# Patient Record
Sex: MALE | Race: WHITE
[De-identification: names, ages, dates, MRNs, and addresses within clinical notes are randomized per-mention and may not be internally consistent; named-entity substitution may affect disease eponyms.]

---

## 2017-05-07 ENCOUNTER — HOSPITAL ENCOUNTER (EMERGENCY)
Dept: HOSPITAL 47 - EC | Age: 32
Discharge: HOME | End: 2017-05-07
Payer: COMMERCIAL

## 2017-05-07 VITALS
TEMPERATURE: 98.3 F | DIASTOLIC BLOOD PRESSURE: 79 MMHG | SYSTOLIC BLOOD PRESSURE: 129 MMHG | HEART RATE: 77 BPM | RESPIRATION RATE: 20 BRPM
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
## 2017-05-07 DIAGNOSIS — F17.200: ICD-10-CM

## 2017-05-07 DIAGNOSIS — S02.5XXA: Primary | ICD-10-CM

## 2017-05-07 PROCEDURE — 96372 THER/PROPH/DIAG INJ SC/IM: CPT

## 2017-05-07 PROCEDURE — 99282 EMERGENCY DEPT VISIT SF MDM: CPT

## 2017-05-07 NOTE — ED
ENT HPI





- General


Chief complaint: Dental/Oral


Stated complaint: tooth pain


Time Seen by Provider: 05/07/17 12:15


Source: patient, RN notes reviewed


Mode of arrival: ambulatory


Limitations: no limitations





- History of Present Illness


Initial comments: 





32 year old male presents to the ER complaining of dental pain that started 

yesterday.  That he does have a history of a broken tooth on the lower left 

side.  He states that the pain is diffuse on the left side upper and lower jaw.

  He states that he does not have any tongue swelling, difficulty swallowing, 

throat pain, drooling.  He states that he has not been eating due to the pain 

of chewing.  He does take Norco regularly for osteoarthritis at home however 

this hasn't even been helping the pain.  He denies any constitutional symptoms 

including chest pain, abdominal pain, shortness of breath.





- Related Data


 Home Medications











 Medication  Instructions  Recorded  Confirmed


 


HYDROcodone/APAP 10-325MG [Norco 1 each PO Q6H PRN 08/04/15 08/04/15





10]   








 Previous Rx's











 Medication  Instructions  Recorded


 


Ketorolac [Toradol] 10 mg PO Q6HR #15 tab 08/04/15


 


Tamsulosin HCl [Flomax] 0.4 mg PO DAILY #10 cap 08/04/15


 


Penicillin V Potassium [Pen Vee K] 500 mg PO TID #30 tab 05/07/17











 Allergies











Allergy/AdvReac Type Severity Reaction Status Date / Time


 


No Known Allergies Allergy   Verified 05/07/17 11:23














Review of Systems


ROS Statement: 


Those systems with pertinent positive or pertinent negative responses have been 

documented in the HPI.





ROS Other: All systems not noted in ROS Statement are negative.





Past Medical History


Past Medical History: Osteoarthritis (OA)


Additional Past Medical History / Comment(s): back pain, shoulder pain, kidney 

stones, neck pain


History of Any Multi-Drug Resistant Organisms: None Reported


Past Surgical History: No Surgical Hx Reported


Past Psychological History: No Psychological Hx Reported


Smoking Status: Current every day smoker


Past Alcohol Use History: None Reported


Past Drug Use History: None Reported





General Exam


Limitations: no limitations


General appearance: alert, other (appears to be in discomfort)


Head exam: Present: atraumatic


Eye exam: Present: normal appearance, PERRL, EOMI


Pupils: Present: normal accommodation


ENT exam: Present: mucous membranes moist, normal external ear exam, other (

mild edema left cheek, tenderness with palpation upper and lower jaw, no 

abscess appreciated, broken molar lower left, right WNL)


Neck exam: Present: lymphadenopathy (mild left cervical)


Respiratory exam: Present: wheezes (mild secondary to smoking)


Cardiovascular Exam: Present: regular rate, normal rhythm





Course





 Vital Signs











  05/07/17





  11:21


 


Temperature 98.3 F


 


Pulse Rate 77


 


Respiratory 20





Rate 


 


Blood Pressure 129/79


 


O2 Sat by Pulse 98





Oximetry 














Medical Decision Making





- Medical Decision Making





32 year old male presented to the ER complaining of dental and jaw pain on the 

left side.  He stated that the pain worsened yesterday and had trouble sleeping 

last night.  He states that he has undergone regularly for back pain and this 

is not helping the pain currently.  He states that he does have to work this 

week and is concerned with a possible infection and the pain.  Upon exam he 

does have some mild tissue swelling on the left cheek and he does have 

tenderness with palpation on the upper and lower molars.  There is no evidence 

of abscess.  We will recommend treatment with an follow-up with a dentist this 

week.  Patient was given Toradol injection in the ER to help control 

inflammation and pain.  Recommended to continue with his home dose of his 

Norco.  Patient is to return to the ER with any difficulty swallowing, drooling

, throat pain.  Patient was agreeable to treatment plan and voiced 

understanding.





Disposition


Clinical Impression: 


 Fracture of tooth, Oral infection





Disposition: HOME SELF-CARE


Condition: Good


Instructions:  Toothache (ED)


Additional Instructions: 


To return to the ER if any worsening symptoms or concerns.  To follow up with 

dentist this week.  To call insurance company with list of dentists available.


Referrals: 


Louis Marinelli MD [Primary Care Provider] - 1-2 days


Time of Disposition: 12:41

## 2018-03-21 ENCOUNTER — HOSPITAL ENCOUNTER (EMERGENCY)
Dept: HOSPITAL 47 - EC | Age: 33
Discharge: HOME | End: 2018-03-21
Payer: COMMERCIAL

## 2018-03-21 VITALS
SYSTOLIC BLOOD PRESSURE: 101 MMHG | TEMPERATURE: 97.2 F | RESPIRATION RATE: 16 BRPM | HEART RATE: 57 BPM | DIASTOLIC BLOOD PRESSURE: 56 MMHG

## 2018-03-21 DIAGNOSIS — M19.90: ICD-10-CM

## 2018-03-21 DIAGNOSIS — R52: ICD-10-CM

## 2018-03-21 DIAGNOSIS — R63.8: Primary | ICD-10-CM

## 2018-03-21 DIAGNOSIS — F17.200: ICD-10-CM

## 2018-03-21 DIAGNOSIS — Z79.891: ICD-10-CM

## 2018-03-21 DIAGNOSIS — R68.83: ICD-10-CM

## 2018-03-21 DIAGNOSIS — Z79.899: ICD-10-CM

## 2018-03-21 LAB
ANION GAP SERPL CALC-SCNC: 11 MMOL/L
BASOPHILS # BLD AUTO: 0.1 K/UL (ref 0–0.2)
BASOPHILS NFR BLD AUTO: 1 %
BUN SERPL-SCNC: 16 MG/DL (ref 9–20)
CALCIUM SPEC-MCNC: 9.6 MG/DL (ref 8.4–10.2)
CHLORIDE SERPL-SCNC: 101 MMOL/L (ref 98–107)
CO2 SERPL-SCNC: 28 MMOL/L (ref 22–30)
EOSINOPHIL # BLD AUTO: 0.1 K/UL (ref 0–0.7)
EOSINOPHIL NFR BLD AUTO: 2 %
ERYTHROCYTE [DISTWIDTH] IN BLOOD BY AUTOMATED COUNT: 4.62 M/UL (ref 4.3–5.9)
ERYTHROCYTE [DISTWIDTH] IN BLOOD: 12.6 % (ref 11.5–15.5)
GLUCOSE SERPL-MCNC: 87 MG/DL (ref 74–99)
HCT VFR BLD AUTO: 43.6 % (ref 39–53)
HGB BLD-MCNC: 15.4 GM/DL (ref 13–17.5)
LYMPHOCYTES # SPEC AUTO: 1.2 K/UL (ref 1–4.8)
LYMPHOCYTES NFR SPEC AUTO: 17 %
MCH RBC QN AUTO: 33.3 PG (ref 25–35)
MCHC RBC AUTO-ENTMCNC: 35.3 G/DL (ref 31–37)
MCV RBC AUTO: 94.4 FL (ref 80–100)
MONOCYTES # BLD AUTO: 0.4 K/UL (ref 0–1)
MONOCYTES NFR BLD AUTO: 5 %
NEUTROPHILS # BLD AUTO: 5.4 K/UL (ref 1.3–7.7)
NEUTROPHILS NFR BLD AUTO: 74 %
PH UR: 7 [PH] (ref 5–8)
PLATELET # BLD AUTO: 268 K/UL (ref 150–450)
POTASSIUM SERPL-SCNC: 4.4 MMOL/L (ref 3.5–5.1)
SODIUM SERPL-SCNC: 140 MMOL/L (ref 137–145)
SP GR UR: 1.02 (ref 1–1.03)
UROBILINOGEN UR QL STRIP: 2 MG/DL (ref ?–2)
WBC # BLD AUTO: 7.3 K/UL (ref 3.8–10.6)

## 2018-03-21 PROCEDURE — 80048 BASIC METABOLIC PNL TOTAL CA: CPT

## 2018-03-21 PROCEDURE — 99284 EMERGENCY DEPT VISIT MOD MDM: CPT

## 2018-03-21 PROCEDURE — 85025 COMPLETE CBC W/AUTO DIFF WBC: CPT

## 2018-03-21 PROCEDURE — 87086 URINE CULTURE/COLONY COUNT: CPT

## 2018-03-21 PROCEDURE — 96360 HYDRATION IV INFUSION INIT: CPT

## 2018-03-21 PROCEDURE — 87502 INFLUENZA DNA AMP PROBE: CPT

## 2018-03-21 PROCEDURE — 36415 COLL VENOUS BLD VENIPUNCTURE: CPT

## 2018-03-21 PROCEDURE — 81003 URINALYSIS AUTO W/O SCOPE: CPT

## 2018-03-21 PROCEDURE — 96361 HYDRATE IV INFUSION ADD-ON: CPT

## 2018-03-21 PROCEDURE — 80306 DRUG TEST PRSMV INSTRMNT: CPT

## 2018-03-21 NOTE — ED
Recheck HPI





- General


Chief Complaint: Recheck/Abnormal Lab/Rx


Stated Complaint: FLU LIKE SYMPTOMS


Time Seen by Provider: 03/21/18 08:15


Source: patient, RN notes reviewed, old records reviewed


Mode of arrival: ambulatory


Limitations: no limitations





- History of Present Illness


Initial Comments: 





patient 33-year-old male comes in chief complaint of body aches, chills, lack 

of appetite.  He reports his symptoms going on for 2 days.  He states he has 

not been drinking any much fluids.  Patient states that he has no specific 

belly pain.  Normal bowel movements and urination.  He states he's had no 

vomiting.  He denies any specific sore throat or coughing.  Denies any chest 

pain or shortness of breath.  He is a smoker.  He does take Norco regularly for 

chronic arthritis.





- Related Data


 Home Medications











 Medication  Instructions  Recorded  Confirmed


 


HYDROcodone/APAP 10-325MG [Norco 1 tab PO Q6H PRN 08/04/15 03/21/18





10]   








 Previous Rx's











 Medication  Instructions  Recorded


 


Ondansetron Odt [Zofran Odt] 4 mg PO Q8HR PRN #12 tab 03/21/18











 Allergies











Allergy/AdvReac Type Severity Reaction Status Date / Time


 


No Known Allergies Allergy   Verified 03/21/18 08:31














Review of Systems


ROS Statement: 


Those systems with pertinent positive or pertinent negative responses have been 

documented in the HPI.





ROS Other: All systems not noted in ROS Statement are negative.





Past Medical History


Past Medical History: Osteoarthritis (OA)


Additional Past Medical History / Comment(s): back pain, shoulder pain, kidney 

stones, neck pain


History of Any Multi-Drug Resistant Organisms: None Reported


Past Surgical History: No Surgical Hx Reported


Past Psychological History: No Psychological Hx Reported


Smoking Status: Current every day smoker


Past Alcohol Use History: None Reported


Past Drug Use History: None Reported





General Exam





- General Exam Comments


Initial Comments: 





this is a 33-year-old male.  No acute distress.


Limitations: no limitations


General appearance: alert, in no apparent distress


Head exam: Present: atraumatic, normocephalic, normal inspection


Eye exam: Present: normal appearance, PERRL, EOMI.  Absent: scleral icterus, 

conjunctival injection, periorbital swelling


ENT exam: Present: normal exam, mucous membranes moist


Neck exam: Present: normal inspection.  Absent: tenderness, meningismus, 

lymphadenopathy


Respiratory exam: Present: normal lung sounds bilaterally


Cardiovascular Exam: Present: regular rate, normal rhythm, normal heart sounds.

  Absent: systolic murmur, diastolic murmur, rubs, gallop, clicks


GI/Abdominal exam: Present: soft, normal bowel sounds.  Absent: distended, 

tenderness, guarding, rebound, rigid


Extremities exam: Present: normal inspection, full ROM, normal capillary 

refill.  Absent: tenderness, pedal edema, joint swelling, calf tenderness


Back exam: Present: normal inspection


Neurological exam: Present: alert, oriented X3, CN II-XII intact


Psychiatric exam: Present: normal affect, normal mood


Skin exam: Present: warm, dry, intact, normal color.  Absent: rash





Course


 Vital Signs











  03/21/18 03/21/18





  08:12 10:25


 


Temperature 98.1 F 97.2 F L


 


Pulse Rate 68 57 L


 


Respiratory 20 16





Rate  


 


Blood Pressure 119/68 101/56


 


O2 Sat by Pulse 98 98





Oximetry  














Medical Decision Making





- Medical Decision Making


Patient 33-year-old male comes in chief complaint of body aches, chills, lack 

of appetite.  He reports his symptoms going on for 2 days.  He states he has 

not been drinking any much fluids. Patient has no one specific symptoms. No 

abdominal pain, chest pain, cough, shortness of breath. Physical exam findings 

are benign. No tenderness, and lungs are clear. Pt was given IV fluids and labs 

are within normal limitis. Discussed likely viral etiology. Will discharge with 

zofran for nausea, and advised to stay hydrated. Discussed return parameters. 








- Lab Data


Result diagrams: 


 03/21/18 08:45





 03/21/18 08:45


 Lab Results











  03/21/18 03/21/18 03/21/18 Range/Units





  08:45 08:45 08:45 


 


WBC    7.3  (3.8-10.6)  k/uL


 


RBC    4.62  (4.30-5.90)  m/uL


 


Hgb    15.4  (13.0-17.5)  gm/dL


 


Hct    43.6  (39.0-53.0)  %


 


MCV    94.4  (80.0-100.0)  fL


 


MCH    33.3  (25.0-35.0)  pg


 


MCHC    35.3  (31.0-37.0)  g/dL


 


RDW    12.6  (11.5-15.5)  %


 


Plt Count    268  (150-450)  k/uL


 


Neutrophils %    74  %


 


Lymphocytes %    17  %


 


Monocytes %    5  %


 


Eosinophils %    2  %


 


Basophils %    1  %


 


Neutrophils #    5.4  (1.3-7.7)  k/uL


 


Lymphocytes #    1.2  (1.0-4.8)  k/uL


 


Monocytes #    0.4  (0-1.0)  k/uL


 


Eosinophils #    0.1  (0-0.7)  k/uL


 


Basophils #    0.1  (0-0.2)  k/uL


 


Sodium   140   (137-145)  mmol/L


 


Potassium   4.4   (3.5-5.1)  mmol/L


 


Chloride   101   ()  mmol/L


 


Carbon Dioxide   28   (22-30)  mmol/L


 


Anion Gap   11   mmol/L


 


BUN   16   (9-20)  mg/dL


 


Creatinine   0.79   (0.66-1.25)  mg/dL


 


Est GFR (CKD-EPI)AfAm   >90   (>60 ml/min/1.73 sqM)  


 


Est GFR (CKD-EPI)NonAf   >90   (>60 ml/min/1.73 sqM)  


 


Glucose   87   (74-99)  mg/dL


 


Calcium   9.6   (8.4-10.2)  mg/dL


 


Urine Color  Yellow    


 


Urine Appearance  Clear    (Clear)  


 


Urine pH  7.0    (5.0-8.0)  


 


Ur Specific Gravity  1.017    (1.001-1.035)  


 


Urine Protein  Negative    (Negative)  


 


Urine Glucose (UA)  Negative    (Negative)  


 


Urine Ketones  Negative    (Negative)  


 


Urine Blood  Negative    (Negative)  


 


Urine Nitrite  Negative    (Negative)  


 


Urine Bilirubin  Negative    (Negative)  


 


Urine Urobilinogen  2.0    (<2.0)  mg/dL


 


Ur Leukocyte Esterase  Negative    (Negative)  


 


Urine Opiates Screen  Detected H    (NotDetected)  


 


Ur Oxycodone Screen  Not Detected    (NotDetected)  


 


Urine Methadone Screen  Not Detected    (NotDetected)  


 


Ur Propoxyphene Screen  Not Detected    (NotDetected)  


 


Ur Barbiturates Screen  Not Detected    (NotDetected)  


 


U Tricyclic Antidepress  Not Detected    (NotDetected)  


 


Ur Phencyclidine Scrn  Not Detected    (NotDetected)  


 


Ur Amphetamines Screen  Not Detected    (NotDetected)  


 


U Methamphetamines Scrn  Not Detected    (NotDetected)  


 


U Benzodiazepines Scrn  Not Detected    (NotDetected)  


 


Urine Cocaine Screen  Not Detected    (NotDetected)  


 


U Marijuana (THC) Screen  Not Detected    (NotDetected)  


 


Influenza Type A RNA     (Not Detectd)  


 


Influenza Type B (PCR)     (Not Detectd)  














  03/21/18 Range/Units





  08:45 


 


WBC   (3.8-10.6)  k/uL


 


RBC   (4.30-5.90)  m/uL


 


Hgb   (13.0-17.5)  gm/dL


 


Hct   (39.0-53.0)  %


 


MCV   (80.0-100.0)  fL


 


MCH   (25.0-35.0)  pg


 


MCHC   (31.0-37.0)  g/dL


 


RDW   (11.5-15.5)  %


 


Plt Count   (150-450)  k/uL


 


Neutrophils %   %


 


Lymphocytes %   %


 


Monocytes %   %


 


Eosinophils %   %


 


Basophils %   %


 


Neutrophils #   (1.3-7.7)  k/uL


 


Lymphocytes #   (1.0-4.8)  k/uL


 


Monocytes #   (0-1.0)  k/uL


 


Eosinophils #   (0-0.7)  k/uL


 


Basophils #   (0-0.2)  k/uL


 


Sodium   (137-145)  mmol/L


 


Potassium   (3.5-5.1)  mmol/L


 


Chloride   ()  mmol/L


 


Carbon Dioxide   (22-30)  mmol/L


 


Anion Gap   mmol/L


 


BUN   (9-20)  mg/dL


 


Creatinine   (0.66-1.25)  mg/dL


 


Est GFR (CKD-EPI)AfAm   (>60 ml/min/1.73 sqM)  


 


Est GFR (CKD-EPI)NonAf   (>60 ml/min/1.73 sqM)  


 


Glucose   (74-99)  mg/dL


 


Calcium   (8.4-10.2)  mg/dL


 


Urine Color   


 


Urine Appearance   (Clear)  


 


Urine pH   (5.0-8.0)  


 


Ur Specific Gravity   (1.001-1.035)  


 


Urine Protein   (Negative)  


 


Urine Glucose (UA)   (Negative)  


 


Urine Ketones   (Negative)  


 


Urine Blood   (Negative)  


 


Urine Nitrite   (Negative)  


 


Urine Bilirubin   (Negative)  


 


Urine Urobilinogen   (<2.0)  mg/dL


 


Ur Leukocyte Esterase   (Negative)  


 


Urine Opiates Screen   (NotDetected)  


 


Ur Oxycodone Screen   (NotDetected)  


 


Urine Methadone Screen   (NotDetected)  


 


Ur Propoxyphene Screen   (NotDetected)  


 


Ur Barbiturates Screen   (NotDetected)  


 


U Tricyclic Antidepress   (NotDetected)  


 


Ur Phencyclidine Scrn   (NotDetected)  


 


Ur Amphetamines Screen   (NotDetected)  


 


U Methamphetamines Scrn   (NotDetected)  


 


U Benzodiazepines Scrn   (NotDetected)  


 


Urine Cocaine Screen   (NotDetected)  


 


U Marijuana (THC) Screen   (NotDetected)  


 


Influenza Type A RNA  Not Detected  (Not Detectd)  


 


Influenza Type B (PCR)  Not Detected  (Not Detectd)  














Disposition


Clinical Impression: 


 Poor appetite, Body aches





Disposition: HOME SELF-CARE


Condition: Good


Instructions:  Viral Syndrome (ED)


Additional Instructions: 


He  needs to rest, increase  fluid intake.  Follow-up with primary care 

provider symptoms are continue to persist over the next couple days.  Take 

nausea medicine as needed.  Return to emergency department if any alarming 

signs or symptoms occur.


Prescriptions: 


Ondansetron Odt [Zofran Odt] 4 mg PO Q8HR PRN #12 tab


 PRN Reason: Nausea


Referrals: 


Louis Marinelli MD [Primary Care Provider] - 1-2 days


Time of Disposition: 09:57

## 2018-09-17 ENCOUNTER — HOSPITAL ENCOUNTER (EMERGENCY)
Dept: HOSPITAL 47 - EC | Age: 33
Discharge: HOME | End: 2018-09-17
Payer: COMMERCIAL

## 2018-09-17 VITALS
SYSTOLIC BLOOD PRESSURE: 116 MMHG | HEART RATE: 60 BPM | TEMPERATURE: 98.1 F | RESPIRATION RATE: 16 BRPM | DIASTOLIC BLOOD PRESSURE: 74 MMHG

## 2018-09-17 DIAGNOSIS — F17.200: ICD-10-CM

## 2018-09-17 DIAGNOSIS — N20.2: Primary | ICD-10-CM

## 2018-09-17 LAB
ANION GAP SERPL CALC-SCNC: 10 MMOL/L
BASOPHILS # BLD AUTO: 0.1 K/UL (ref 0–0.2)
BASOPHILS NFR BLD AUTO: 0 %
BUN SERPL-SCNC: 23 MG/DL (ref 9–20)
CALCIUM SPEC-MCNC: 9.7 MG/DL (ref 8.4–10.2)
CHLORIDE SERPL-SCNC: 103 MMOL/L (ref 98–107)
CO2 SERPL-SCNC: 27 MMOL/L (ref 22–30)
EOSINOPHIL # BLD AUTO: 0.2 K/UL (ref 0–0.7)
EOSINOPHIL NFR BLD AUTO: 1 %
ERYTHROCYTE [DISTWIDTH] IN BLOOD BY AUTOMATED COUNT: 4.57 M/UL (ref 4.3–5.9)
ERYTHROCYTE [DISTWIDTH] IN BLOOD: 12.6 % (ref 11.5–15.5)
GLUCOSE SERPL-MCNC: 93 MG/DL (ref 74–99)
HCT VFR BLD AUTO: 44.9 % (ref 39–53)
HGB BLD-MCNC: 14.5 GM/DL (ref 13–17.5)
LYMPHOCYTES # SPEC AUTO: 1.7 K/UL (ref 1–4.8)
LYMPHOCYTES NFR SPEC AUTO: 13 %
MCH RBC QN AUTO: 31.9 PG (ref 25–35)
MCHC RBC AUTO-ENTMCNC: 32.4 G/DL (ref 31–37)
MCV RBC AUTO: 98.3 FL (ref 80–100)
MONOCYTES # BLD AUTO: 0.9 K/UL (ref 0–1)
MONOCYTES NFR BLD AUTO: 7 %
NEUTROPHILS # BLD AUTO: 9.9 K/UL (ref 1.3–7.7)
NEUTROPHILS NFR BLD AUTO: 77 %
PH UR: 6 [PH] (ref 5–8)
PLATELET # BLD AUTO: 276 K/UL (ref 150–450)
POTASSIUM SERPL-SCNC: 3.9 MMOL/L (ref 3.5–5.1)
PROT UR QL: (no result)
RBC UR QL: 63 /HPF (ref 0–5)
SODIUM SERPL-SCNC: 140 MMOL/L (ref 137–145)
SP GR UR: 1.03 (ref 1–1.03)
UROBILINOGEN UR QL STRIP: <2 MG/DL (ref ?–2)
WBC # BLD AUTO: 13 K/UL (ref 3.8–10.6)
WBC #/AREA URNS HPF: 2 /HPF (ref 0–5)

## 2018-09-17 PROCEDURE — 36415 COLL VENOUS BLD VENIPUNCTURE: CPT

## 2018-09-17 PROCEDURE — 81001 URINALYSIS AUTO W/SCOPE: CPT

## 2018-09-17 PROCEDURE — 74177 CT ABD & PELVIS W/CONTRAST: CPT

## 2018-09-17 PROCEDURE — 80048 BASIC METABOLIC PNL TOTAL CA: CPT

## 2018-09-17 PROCEDURE — 85025 COMPLETE CBC W/AUTO DIFF WBC: CPT

## 2018-09-17 PROCEDURE — 96375 TX/PRO/DX INJ NEW DRUG ADDON: CPT

## 2018-09-17 PROCEDURE — 96374 THER/PROPH/DIAG INJ IV PUSH: CPT

## 2018-09-17 PROCEDURE — 99284 EMERGENCY DEPT VISIT MOD MDM: CPT

## 2018-09-17 PROCEDURE — 96361 HYDRATE IV INFUSION ADD-ON: CPT

## 2018-09-17 NOTE — CT
EXAMINATION TYPE: CT abdomen pelvis w con

 

DATE OF EXAM: 9/17/2018

 

COMPARISON: 4/18/2014

 

HISTORY: Abdominal pain, kidney stones

 

CT DLP: 760 mGycm

Automated exposure control for dose reduction was used.

 

TECHNIQUE:  Helical acquisition of images was performed from the lung bases through the pelvis.

 

CONTRAST: 

Performed without Oral Contrast and with IV Contrast, patient injected with 100 ml mL of Isovue 300.

 

FINDINGS: 

 

Lung bases are clear. There is no pleural effusion. Liver appears normal. There are multiple calcifie
d granulomata in the spleen. There is no pancreatic mass. Gallbladder appears normal. There is no adr
enal mass. There is left side hydronephrosis and hydroureter. There is left-sided perinephric edema.

 

There is decreased cortical contrast opacification of the left kidney compared to the right. Delayed 
images show normal opacification of the right ureter. There are bilateral small renal calculi. There 
is a 6 mm calculus in the proximal left ureter.

There is high-grade left renal obstruction.

There is no retroperitoneal adenopathy. There is no ascites. There is no intestinal wall thickening. 
There are no dilated loops. Appendix appears normal. Bladder distends smoothly. There is no evidence 
of a pelvic mass. Lumbar spine appears intact.

 

IMPRESSION: 

MULTIPLE SMALL RENAL BILATERAL CALCULI.

 

OBSTRUCTING CALCULUS AT THE LEFT PROXIMAL URETER. OBSTRUCTION IS NEW COMPARED TO OLD EXAM.

## 2018-09-17 NOTE — ED
General Adult HPI





- General


Chief complaint: Abdominal Pain


Stated complaint: Abd pain, kidney stone


Time Seen by Provider: 09/17/18 05:47


Source: patient, EMS


Mode of arrival: EMS


Limitations: no limitations





- History of Present Illness


Initial comments: 


Tanner is a 33-year-old male with a past medical history of kidney stone in 

the past who presents the emergency department today for evaluation of left 

lower quadrant abdominal pain.  Patient reports that yesterday he had some 

diarrhea, he reports 7 or 8 episodes of nonbloody diarrhea.  He reports that he 

fell squarely developed some left-sided abdominal pain.  Pain began around 10 

PM.  Was originally crampy in nature with waves of sharp stabbing pain.  Pain 

persisted throughout the night kept him awake.  Patient can't identify any 

relieving or exacerbating factors to the pain.  The pain did not go which 

prompted him to call an ambulance for transport to the emergency department.  

Pain is associated with nausea but no vomiting.  Patient has not had any 

further episodes of diarrhea since the pain began.  Patient has no known GI 

pathology, he has no history of ulcerative colitis or Crohn's, no family 

history of such.  He has never had a colonoscopy.  He has any suspicious food 

intake.


Patient does state that he does take chronic narcotics for his chronic back 

pain.








- Related Data


 Home Medications











 Medication  Instructions  Recorded  Confirmed


 


HYDROcodone/APAP 10-325MG [Norco 1 tab PO Q6H PRN 08/04/15 09/17/18





10]   








 Previous Rx's











 Medication  Instructions  Recorded


 


Ondansetron [Zofran ODT] 4 mg PO Q8HR #12 tab 09/17/18


 


Tamsulosin [Flomax] 0.4 mg PO DAILY #7 cap 09/17/18











 Allergies











Allergy/AdvReac Type Severity Reaction Status Date / Time


 


No Known Allergies Allergy   Verified 09/17/18 07:15














Review of Systems


ROS Statement: 


Those systems with pertinent positive or pertinent negative responses have been 

documented in the HPI.





ROS Other: All systems not noted in ROS Statement are negative.





Past Medical History


Past Medical History: Osteoarthritis (OA)


Additional Past Medical History / Comment(s): back pain, shoulder pain, kidney 

stones, neck pain


History of Any Multi-Drug Resistant Organisms: None Reported


Past Surgical History: No Surgical Hx Reported


Past Psychological History: Anxiety, Bipolar


Smoking Status: Current every day smoker


Past Alcohol Use History: Rare


Past Drug Use History: None Reported





General Exam





- General Exam Comments


Initial Comments: 


GENERAL:


Patient is well-developed and well-nourished.  Patient is nontoxic and well-

hydrated


Patient in moderate distress





HENT:


Normocephalic, Atraumatic. 


Neck is soft and supple.  No significant lymphadenopathy is noted.  Oropharynx 

is clear.  Moist mucous membranes.  Neck has full range of motion without 

eliciting any pain.  





EYES:


The sclera were anicteric and conjunctiva were pink and moist.  Extraocular 

movements were intact and pupils were equal round and reactive to light.  

Eyelids were unremarkable.





PULMONARY:


Unlabored respirations.  Good breath sounds bilaterally.  No audible rales 

rhonchi or wheezing was noted.





CARDIOVASCULAR:


There is a regular rate and rhythm without any murmurs gallops or rubs.  





ABDOMEN:


Abdomen is soft, tender to palpation of the left lower quadrant, no flank pain 

elicited upon evaluation





SKIN:


Skin is clear with no lesions or rashes and otherwise unremarkable.





NEUROLOGIC:


Patient is alert and oriented x3.  Cranial nerves II through XII are grossly 

intact.  Motor and sensory are also intact.  Normal speech, volume and content.

  Symmetrical smile. 





MUSCULOSKELETAL:


Normal extremities with adequate strength and full range of motion.  No lower 

extremity swelling or edema.  No calf tenderness.  





LYMPHATICS:


No significant lymphadenopathy is noted





PSYCHIATRIC:


Normal psychiatric evaluation.  





Limitations: no limitations





Limitations: no limitations





Course


 Vital Signs











  09/17/18 09/17/18





  05:42 07:02


 


Temperature 98.7 F 


 


Pulse Rate 64 78


 


Respiratory 19 17





Rate  


 


Blood Pressure 141/86 121/73


 


O2 Sat by Pulse 100 100





Oximetry  














Medical Decision Making





- Medical Decision Making


Seen and evaluated, history is concerning for a possible kidney stone, however 

considering the GI symptoms preceding the left sided abdominal pain I will 

order a CT with contrast to evaluate for any bowel pathology


Labs with elevated creatinine


CT with 6mm proximal ureteral stone





Patient with improvement with IV morphine


Patient has PO Narcotics at home, will d/c with Flomax and Zofran





Return parameters discussed, patient discharged home in stable condition








- Lab Data


Result diagrams: 


 09/17/18 05:50





 09/17/18 05:50


 Lab Results











  09/17/18 09/17/18 09/17/18 Range/Units





  05:50 05:50 06:57 


 


WBC   13.0 H   (3.8-10.6)  k/uL


 


RBC   4.57   (4.30-5.90)  m/uL


 


Hgb   14.5   (13.0-17.5)  gm/dL


 


Hct   44.9   (39.0-53.0)  %


 


MCV   98.3   (80.0-100.0)  fL


 


MCH   31.9   (25.0-35.0)  pg


 


MCHC   32.4   (31.0-37.0)  g/dL


 


RDW   12.6   (11.5-15.5)  %


 


Plt Count   276   (150-450)  k/uL


 


Neutrophils %   77   %


 


Lymphocytes %   13   %


 


Monocytes %   7   %


 


Eosinophils %   1   %


 


Basophils %   0   %


 


Neutrophils #   9.9 H   (1.3-7.7)  k/uL


 


Lymphocytes #   1.7   (1.0-4.8)  k/uL


 


Monocytes #   0.9   (0-1.0)  k/uL


 


Eosinophils #   0.2   (0-0.7)  k/uL


 


Basophils #   0.1   (0-0.2)  k/uL


 


Sodium  140    (137-145)  mmol/L


 


Potassium  3.9    (3.5-5.1)  mmol/L


 


Chloride  103    ()  mmol/L


 


Carbon Dioxide  27    (22-30)  mmol/L


 


Anion Gap  10    mmol/L


 


BUN  23 H    (9-20)  mg/dL


 


Creatinine  1.60 H    (0.66-1.25)  mg/dL


 


Est GFR (CKD-EPI)AfAm  64    (>60 ml/min/1.73 sqM)  


 


Est GFR (CKD-EPI)NonAf  55    (>60 ml/min/1.73 sqM)  


 


Glucose  93    (74-99)  mg/dL


 


Calcium  9.7    (8.4-10.2)  mg/dL


 


Urine Color    Yellow  


 


Urine Appearance    Clear  (Clear)  


 


Urine pH    6.0  (5.0-8.0)  


 


Ur Specific Gravity    1.029  (1.001-1.035)  


 


Urine Protein    1+ H  (Negative)  


 


Urine Glucose (UA)    Negative  (Negative)  


 


Urine Ketones    Negative  (Negative)  


 


Urine Blood    Large H  (Negative)  


 


Urine Nitrite    Negative  (Negative)  


 


Urine Bilirubin    Negative  (Negative)  


 


Urine Urobilinogen    <2.0  (<2.0)  mg/dL


 


Ur Leukocyte Esterase    Negative  (Negative)  


 


Urine RBC    63 H  (0-5)  /hpf


 


Urine WBC    2  (0-5)  /hpf


 


Calcium Oxalate Crystal    Occasional H  (None)  /hpf


 


Urine Mucus    Rare H  (None)  /hpf














Disposition


Clinical Impression: 


 Kidney stone on left side





Disposition: HOME SELF-CARE


Condition: Stable


Instructions:  Kidney Stones (ED)


Prescriptions: 


Ondansetron [Zofran ODT] 4 mg PO Q8HR #12 tab


Tamsulosin [Flomax] 0.4 mg PO DAILY #7 cap


Is patient prescribed a controlled substance at d/c from ED?: No


Referrals: 


Louis Marinelli MD [Primary Care Provider] - 1-2 days

## 2018-11-28 ENCOUNTER — HOSPITAL ENCOUNTER (EMERGENCY)
Dept: HOSPITAL 47 - EC | Age: 33
Discharge: HOME | End: 2018-11-28
Payer: COMMERCIAL

## 2018-11-28 VITALS
DIASTOLIC BLOOD PRESSURE: 81 MMHG | RESPIRATION RATE: 18 BRPM | SYSTOLIC BLOOD PRESSURE: 114 MMHG | TEMPERATURE: 97.6 F | HEART RATE: 72 BPM

## 2018-11-28 DIAGNOSIS — F17.200: ICD-10-CM

## 2018-11-28 DIAGNOSIS — N20.0: Primary | ICD-10-CM

## 2018-11-28 LAB
ALBUMIN SERPL-MCNC: 4.3 G/DL (ref 3.5–5)
ALP SERPL-CCNC: 42 U/L (ref 38–126)
ALT SERPL-CCNC: 21 U/L (ref 21–72)
AMYLASE SERPL-CCNC: 59 U/L (ref 30–110)
ANION GAP SERPL CALC-SCNC: 9 MMOL/L
AST SERPL-CCNC: 25 U/L (ref 17–59)
BASOPHILS # BLD AUTO: 0 K/UL (ref 0–0.2)
BASOPHILS NFR BLD AUTO: 0 %
BUN SERPL-SCNC: 17 MG/DL (ref 9–20)
CALCIUM SPEC-MCNC: 9.7 MG/DL (ref 8.4–10.2)
CHLORIDE SERPL-SCNC: 106 MMOL/L (ref 98–107)
CO2 SERPL-SCNC: 26 MMOL/L (ref 22–30)
EOSINOPHIL # BLD AUTO: 0.1 K/UL (ref 0–0.7)
EOSINOPHIL NFR BLD AUTO: 1 %
ERYTHROCYTE [DISTWIDTH] IN BLOOD BY AUTOMATED COUNT: 4.54 M/UL (ref 4.3–5.9)
ERYTHROCYTE [DISTWIDTH] IN BLOOD: 13.2 % (ref 11.5–15.5)
GLUCOSE SERPL-MCNC: 115 MG/DL (ref 74–99)
HCT VFR BLD AUTO: 44.8 % (ref 39–53)
HGB BLD-MCNC: 14.6 GM/DL (ref 13–17.5)
LIPASE SERPL-CCNC: 50 U/L (ref 23–300)
LYMPHOCYTES # SPEC AUTO: 1.1 K/UL (ref 1–4.8)
LYMPHOCYTES NFR SPEC AUTO: 12 %
MCH RBC QN AUTO: 32.1 PG (ref 25–35)
MCHC RBC AUTO-ENTMCNC: 32.5 G/DL (ref 31–37)
MCV RBC AUTO: 98.7 FL (ref 80–100)
MONOCYTES # BLD AUTO: 0.6 K/UL (ref 0–1)
MONOCYTES NFR BLD AUTO: 6 %
NEUTROPHILS # BLD AUTO: 7.7 K/UL (ref 1.3–7.7)
NEUTROPHILS NFR BLD AUTO: 80 %
PH UR: 6.5 [PH] (ref 5–8)
PLATELET # BLD AUTO: 172 K/UL (ref 150–450)
POTASSIUM SERPL-SCNC: 4.5 MMOL/L (ref 3.5–5.1)
PROT SERPL-MCNC: 7.1 G/DL (ref 6.3–8.2)
RBC UR QL: 26 /HPF (ref 0–5)
SODIUM SERPL-SCNC: 141 MMOL/L (ref 137–145)
SP GR UR: 1.01 (ref 1–1.03)
SQUAMOUS UR QL AUTO: <1 /HPF (ref 0–4)
UROBILINOGEN UR QL STRIP: <2 MG/DL (ref ?–2)
WBC # BLD AUTO: 9.6 K/UL (ref 3.8–10.6)
WBC #/AREA URNS HPF: <1 /HPF (ref 0–5)

## 2018-11-28 PROCEDURE — 85025 COMPLETE CBC W/AUTO DIFF WBC: CPT

## 2018-11-28 PROCEDURE — 82150 ASSAY OF AMYLASE: CPT

## 2018-11-28 PROCEDURE — 96361 HYDRATE IV INFUSION ADD-ON: CPT

## 2018-11-28 PROCEDURE — 74018 RADEX ABDOMEN 1 VIEW: CPT

## 2018-11-28 PROCEDURE — 96374 THER/PROPH/DIAG INJ IV PUSH: CPT

## 2018-11-28 PROCEDURE — 36415 COLL VENOUS BLD VENIPUNCTURE: CPT

## 2018-11-28 PROCEDURE — 99284 EMERGENCY DEPT VISIT MOD MDM: CPT

## 2018-11-28 PROCEDURE — 81001 URINALYSIS AUTO W/SCOPE: CPT

## 2018-11-28 PROCEDURE — 83690 ASSAY OF LIPASE: CPT

## 2018-11-28 PROCEDURE — 96375 TX/PRO/DX INJ NEW DRUG ADDON: CPT

## 2018-11-28 PROCEDURE — 80053 COMPREHEN METABOLIC PANEL: CPT

## 2018-11-28 NOTE — XR
EXAMINATION TYPE: XR KUB

 

DATE OF EXAM: 11/28/2018

 

CLINICAL DATA:  33-year-old male with left flank pain, abdominal pain, PHH

 

COMPARISON:  CT 9/17/2018

 

FINDINGS:

Grid artifacts are present. Supine imaging limited for assessment of free air. Round calcifications l
eft upper quadrant likely calcified granulomas in the spleen. Suspect some calcified granulomas in th
e liver as well. No significant stool burden. Scattered central and peripheral bowel gas. No abnormal
 bowel dilatation.

 

No definite suspicious calcification radiographically apparent. Bowel content does obscure some of th
e renal shadow.

 

IMPRESSION: 

No significant stool burden. Nonobstructive bowel gas pattern. Small calculi seen on the patient's re
cent CT are not well-demonstrated radiographically.

## 2018-11-28 NOTE — ED
Abdominal Pain HPI





- General


Chief Complaint: Abdominal Pain


Stated Complaint: vomiting, pain all over


Time Seen by Provider: 11/28/18 08:49


Source: patient, RN notes reviewed


Mode of arrival: ambulatory


Limitations: no limitations





- History of Present Illness


Initial Comments: 





33-year-old male presents emergency Department chief complaint of left flank 

pain.  Patient states this started around 4 AM this morning.  Patient does have 

a history kidney stones.  Patient states that he vomited this morning with 

symptoms.  He states never vomited for this kidney stone.  Patient does take 

chronic pain meds which include Norco prescribed by Dr. Reyes.  Patient denies 

any fever or chills.  Patient denies any dysuria, hematuria, diarrhea, 

constipation, chest pain or shortness breath.





- Related Data


 Home Medications











 Medication  Instructions  Recorded  Confirmed


 


HYDROcodone/APAP 10-325MG [Norco 1 tab PO Q6H PRN 08/04/15 11/28/18





10]   








 Previous Rx's











 Medication  Instructions  Recorded


 


Ketorolac [Toradol] 10 mg PO Q8HR #15 tab 11/28/18


 


Ondansetron Odt [Zofran Odt] 4 mg PO Q8HR PRN #10 tab 11/28/18


 


Tamsulosin [Flomax] 0.4 mg PO DAILY #7 cap 11/28/18











 Allergies











Allergy/AdvReac Type Severity Reaction Status Date / Time


 


No Known Allergies Allergy   Verified 11/28/18 09:31














Review of Systems


ROS Statement: 


Those systems with pertinent positive or pertinent negative responses have been 

documented in the HPI.





ROS Other: All systems not noted in ROS Statement are negative.





Past Medical History


Past Medical History: Osteoarthritis (OA)


Additional Past Medical History / Comment(s): back pain, shoulder pain, kidney 

stones, neck pain


History of Any Multi-Drug Resistant Organisms: None Reported


Past Surgical History: No Surgical Hx Reported


Past Psychological History: Anxiety, Bipolar


Smoking Status: Current every day smoker


Past Alcohol Use History: Rare


Past Drug Use History: None Reported





General Exam


Limitations: no limitations


General appearance: alert, in no apparent distress


Head exam: Present: atraumatic, normocephalic, normal inspection


Respiratory exam: Present: normal lung sounds bilaterally.  Absent: respiratory 

distress, wheezes, rales, rhonchi, stridor


Cardiovascular Exam: Present: regular rate, normal rhythm, normal heart sounds.

  Absent: systolic murmur, diastolic murmur, rubs, gallop, clicks


GI/Abdominal exam: Present: soft, tenderness (Mild left-sided), normal bowel 

sounds.  Absent: distended, guarding, rebound, rigid


Back exam: Present: CVA tenderness (L).  Absent: CVA tenderness (R)


Skin exam: Present: warm, dry, intact, normal color.  Absent: rash





Course


 Vital Signs











  11/28/18





  08:41


 


Temperature 97.4 F L


 


Pulse Rate 58 L


 


Respiratory 20





Rate 


 


Blood Pressure 121/62


 


O2 Sat by Pulse 99





Oximetry 














Medical Decision Making





- Medical Decision Making





33-year-old male presented for left flank pain.  Patient has history kidney 

stones.  Symptoms are consistent and is found to have hematuria.  Patient was 

given pain medication be discharged with Zofran, Flomax.  Patient will see pain 

management for pain medication return parameters were discussed.





- Lab Data


Result diagrams: 


 11/28/18 09:38





 11/28/18 09:38


 Lab Results











  11/28/18 11/28/18 11/28/18 Range/Units





  09:38 09:38 11:08 


 


WBC   9.6   (3.8-10.6)  k/uL


 


RBC   4.54   (4.30-5.90)  m/uL


 


Hgb   14.6   (13.0-17.5)  gm/dL


 


Hct   44.8   (39.0-53.0)  %


 


MCV   98.7   (80.0-100.0)  fL


 


MCH   32.1   (25.0-35.0)  pg


 


MCHC   32.5   (31.0-37.0)  g/dL


 


RDW   13.2   (11.5-15.5)  %


 


Plt Count   172   (150-450)  k/uL


 


Neutrophils %   80   %


 


Lymphocytes %   12   %


 


Monocytes %   6   %


 


Eosinophils %   1   %


 


Basophils %   0   %


 


Neutrophils #   7.7   (1.3-7.7)  k/uL


 


Lymphocytes #   1.1   (1.0-4.8)  k/uL


 


Monocytes #   0.6   (0-1.0)  k/uL


 


Eosinophils #   0.1   (0-0.7)  k/uL


 


Basophils #   0.0   (0-0.2)  k/uL


 


Sodium  141    (137-145)  mmol/L


 


Potassium  4.5    (3.5-5.1)  mmol/L


 


Chloride  106    ()  mmol/L


 


Carbon Dioxide  26    (22-30)  mmol/L


 


Anion Gap  9    mmol/L


 


BUN  17    (9-20)  mg/dL


 


Creatinine  0.90    (0.66-1.25)  mg/dL


 


Est GFR (CKD-EPI)AfAm  >90    (>60 ml/min/1.73 sqM)  


 


Est GFR (CKD-EPI)NonAf  >90    (>60 ml/min/1.73 sqM)  


 


Glucose  115 H    (74-99)  mg/dL


 


Calcium  9.7    (8.4-10.2)  mg/dL


 


Total Bilirubin  0.8    (0.2-1.3)  mg/dL


 


AST  25    (17-59)  U/L


 


ALT  21    (21-72)  U/L


 


Alkaline Phosphatase  42    ()  U/L


 


Total Protein  7.1    (6.3-8.2)  g/dL


 


Albumin  4.3    (3.5-5.0)  g/dL


 


Amylase  59    ()  U/L


 


Lipase  50    ()  U/L


 


Urine Color    Light Yellow  


 


Urine Appearance    Clear  (Clear)  


 


Urine pH    6.5  (5.0-8.0)  


 


Ur Specific Gravity    1.006  (1.001-1.035)  


 


Urine Protein    Negative  (Negative)  


 


Urine Glucose (UA)    Negative  (Negative)  


 


Urine Ketones    Negative  (Negative)  


 


Urine Blood    Moderate H  (Negative)  


 


Urine Nitrite    Negative  (Negative)  


 


Urine Bilirubin    Negative  (Negative)  


 


Urine Urobilinogen    <2.0  (<2.0)  mg/dL


 


Ur Leukocyte Esterase    Negative  (Negative)  


 


Urine RBC    26 H  (0-5)  /hpf


 


Urine WBC    <1  (0-5)  /hpf


 


Ur Squamous Epith Cells    <1  (0-4)  /hpf


 


Urine Mucus    Occasional H  (None)  /hpf














Disposition


Clinical Impression: 


 Kidney stone on left side





Disposition: HOME SELF-CARE


Condition: Stable


Instructions:  Kidney Stones (ED)


Additional Instructions: 


Please return to the Emergency Department if symptoms worsen or any other 

concerns.


Prescriptions: 


Ketorolac [Toradol] 10 mg PO Q8HR #15 tab


Ondansetron Odt [Zofran Odt] 4 mg PO Q8HR PRN #10 tab


 PRN Reason: Nausea


Tamsulosin [Flomax] 0.4 mg PO DAILY #7 cap


Is patient prescribed a controlled substance at d/c from ED?: No


Referrals: 


Louis Marinelli MD [Primary Care Provider] - 1-2 days


Robbi Angelo MD [STAFF PHYSICIAN] - 1-2 days


Time of Disposition: 11:43

## 2018-12-30 ENCOUNTER — HOSPITAL ENCOUNTER (EMERGENCY)
Dept: HOSPITAL 47 - EC | Age: 33
Discharge: HOME | End: 2018-12-30
Payer: COMMERCIAL

## 2018-12-30 VITALS
RESPIRATION RATE: 18 BRPM | SYSTOLIC BLOOD PRESSURE: 111 MMHG | HEART RATE: 58 BPM | TEMPERATURE: 98.6 F | DIASTOLIC BLOOD PRESSURE: 72 MMHG

## 2018-12-30 DIAGNOSIS — N13.2: Primary | ICD-10-CM

## 2018-12-30 DIAGNOSIS — D72.829: ICD-10-CM

## 2018-12-30 DIAGNOSIS — Z87.442: ICD-10-CM

## 2018-12-30 DIAGNOSIS — N28.89: ICD-10-CM

## 2018-12-30 DIAGNOSIS — F17.200: ICD-10-CM

## 2018-12-30 LAB
ALBUMIN SERPL-MCNC: 4.5 G/DL (ref 3.5–5)
ALP SERPL-CCNC: 68 U/L (ref 38–126)
ALT SERPL-CCNC: 21 U/L (ref 21–72)
AMYLASE SERPL-CCNC: 81 U/L (ref 30–110)
ANION GAP SERPL CALC-SCNC: 8 MMOL/L
APTT BLD: 26.5 SEC (ref 22–30)
AST SERPL-CCNC: 23 U/L (ref 17–59)
BASOPHILS # BLD AUTO: 0 K/UL (ref 0–0.2)
BASOPHILS NFR BLD AUTO: 0 %
BUN SERPL-SCNC: 18 MG/DL (ref 9–20)
CALCIUM SPEC-MCNC: 9.7 MG/DL (ref 8.4–10.2)
CHLORIDE SERPL-SCNC: 106 MMOL/L (ref 98–107)
CO2 SERPL-SCNC: 28 MMOL/L (ref 22–30)
EOSINOPHIL # BLD AUTO: 0 K/UL (ref 0–0.7)
EOSINOPHIL NFR BLD AUTO: 0 %
ERYTHROCYTE [DISTWIDTH] IN BLOOD BY AUTOMATED COUNT: 4.33 M/UL (ref 4.3–5.9)
ERYTHROCYTE [DISTWIDTH] IN BLOOD: 12.9 % (ref 11.5–15.5)
GLUCOSE SERPL-MCNC: 103 MG/DL (ref 74–99)
HCT VFR BLD AUTO: 42.3 % (ref 39–53)
HGB BLD-MCNC: 14.3 GM/DL (ref 13–17.5)
INR PPP: 1 (ref ?–1.2)
LIPASE SERPL-CCNC: 154 U/L (ref 23–300)
LYMPHOCYTES # SPEC AUTO: 0.9 K/UL (ref 1–4.8)
LYMPHOCYTES NFR SPEC AUTO: 5 %
MCH RBC QN AUTO: 32.9 PG (ref 25–35)
MCHC RBC AUTO-ENTMCNC: 33.7 G/DL (ref 31–37)
MCV RBC AUTO: 97.7 FL (ref 80–100)
MONOCYTES # BLD AUTO: 0.8 K/UL (ref 0–1)
MONOCYTES NFR BLD AUTO: 5 %
NEUTROPHILS # BLD AUTO: 16.8 K/UL (ref 1.3–7.7)
NEUTROPHILS NFR BLD AUTO: 90 %
PH UR: 6.5 [PH] (ref 5–8)
PLATELET # BLD AUTO: 289 K/UL (ref 150–450)
POTASSIUM SERPL-SCNC: 4.2 MMOL/L (ref 3.5–5.1)
PROT SERPL-MCNC: 7.5 G/DL (ref 6.3–8.2)
PT BLD: 10.3 SEC (ref 9–12)
RBC UR QL: 3 /HPF (ref 0–5)
SODIUM SERPL-SCNC: 142 MMOL/L (ref 137–145)
SP GR UR: 1 (ref 1–1.03)
SQUAMOUS UR QL AUTO: <1 /HPF (ref 0–4)
UROBILINOGEN UR QL STRIP: <2 MG/DL (ref ?–2)
WBC # BLD AUTO: 18.7 K/UL (ref 3.8–10.6)

## 2018-12-30 PROCEDURE — 85730 THROMBOPLASTIN TIME PARTIAL: CPT

## 2018-12-30 PROCEDURE — 36415 COLL VENOUS BLD VENIPUNCTURE: CPT

## 2018-12-30 PROCEDURE — 85025 COMPLETE CBC W/AUTO DIFF WBC: CPT

## 2018-12-30 PROCEDURE — 83690 ASSAY OF LIPASE: CPT

## 2018-12-30 PROCEDURE — 81001 URINALYSIS AUTO W/SCOPE: CPT

## 2018-12-30 PROCEDURE — 74176 CT ABD & PELVIS W/O CONTRAST: CPT

## 2018-12-30 PROCEDURE — 96375 TX/PRO/DX INJ NEW DRUG ADDON: CPT

## 2018-12-30 PROCEDURE — 82150 ASSAY OF AMYLASE: CPT

## 2018-12-30 PROCEDURE — 85610 PROTHROMBIN TIME: CPT

## 2018-12-30 PROCEDURE — 80053 COMPREHEN METABOLIC PANEL: CPT

## 2018-12-30 PROCEDURE — 99285 EMERGENCY DEPT VISIT HI MDM: CPT

## 2018-12-30 PROCEDURE — 96374 THER/PROPH/DIAG INJ IV PUSH: CPT

## 2018-12-30 PROCEDURE — 96376 TX/PRO/DX INJ SAME DRUG ADON: CPT

## 2018-12-30 PROCEDURE — 96361 HYDRATE IV INFUSION ADD-ON: CPT

## 2018-12-30 NOTE — ED
Abdominal Pain HPI





- General


Chief Complaint: Abdominal Pain


Stated Complaint: Left flank pain


Time Seen by Provider: 12/30/18 07:11


Source: patient, RN notes reviewed, old records reviewed


Mode of arrival: wheelchair


Limitations: no limitations





- History of Present Illness


Initial Comments: 





Patient is a 33-year-old male presents for his Remicade today with onset of left

-sided back pain radiating towards his groin 7 hours.  Patient reports that he'

s had a history of kidney stones in the past.  Never had this severe pain.  

Patient states that the pain radiates from the back towards his testicle.  

Patient states he has not noticed a change in his urine.  He has vomited 

multiple times today.  Patient has not seen a urologist in the past. Patient 

denies any recent fever, chills, shortness of breath, chest pain,  abdominal 

pain,  numbness or tingling, dysuria or hematuria, constipation or diarrhea, 

headaches or visual changes, or any other current symptoms





- Related Data


 Home Medications











 Medication  Instructions  Recorded  Confirmed


 


HYDROcodone/APAP 10-325MG [Norco 1 tab PO Q6H PRN 08/04/15 12/30/18





10]   








 Previous Rx's











 Medication  Instructions  Recorded


 


HYDROcodone/APAP 5-325MG [Norco 1 tab PO Q6HR PRN 3 Days #12 tab 12/30/18





5-325]  


 


Ketorolac [Toradol] 10 mg PO Q6HR #15 tab 12/30/18


 


Ondansetron Odt [Zofran Odt] 4 mg PO Q8HR PRN #15 tab 12/30/18


 


Tamsulosin [Flomax] 0.4 mg PO DAILY #10 cap 12/30/18











 Allergies











Allergy/AdvReac Type Severity Reaction Status Date / Time


 


No Known Allergies Allergy   Verified 12/30/18 08:34














Review of Systems


ROS Statement: 


Those systems with pertinent positive or pertinent negative responses have been 

documented in the HPI.





ROS Other: All systems not noted in ROS Statement are negative.





Past Medical History


Past Medical History: Osteoarthritis (OA)


Additional Past Medical History / Comment(s): back pain, shoulder pain, kidney 

stones, neck pain


History of Any Multi-Drug Resistant Organisms: None Reported


Past Surgical History: No Surgical Hx Reported


Past Psychological History: Anxiety, Bipolar


Smoking Status: Current every day smoker


Past Alcohol Use History: Rare


Past Drug Use History: None Reported





General Exam





- General Exam Comments


Initial Comments: 





33-year-old male.  Alert and oriented.  Moderate discomfort


Limitations: no limitations


General appearance: alert, in no apparent distress


Head exam: Present: atraumatic, normocephalic, normal inspection


Eye exam: Present: normal appearance, PERRL, EOMI.  Absent: scleral icterus, 

conjunctival injection, periorbital swelling


ENT exam: Present: normal exam, mucous membranes moist


Neck exam: Present: normal inspection.  Absent: tenderness, meningismus, 

lymphadenopathy


Respiratory exam: Present: normal lung sounds bilaterally.  Absent: respiratory 

distress, wheezes, rales, rhonchi, stridor


Cardiovascular Exam: Present: regular rate, normal rhythm, normal heart sounds.

  Absent: systolic murmur, diastolic murmur, rubs, gallop, clicks


GI/Abdominal exam: Present: soft, normal bowel sounds.  Absent: distended, 

tenderness, guarding, rebound, rigid


Extremities exam: Present: normal inspection, full ROM, normal capillary 

refill.  Absent: tenderness, pedal edema, joint swelling, calf tenderness


Back exam: Present: normal inspection, tenderness (Left CVA tenderness)


Neurological exam: Present: alert, oriented X3, CN II-XII intact


Psychiatric exam: Present: normal affect, normal mood


Skin exam: Present: warm, dry, intact, normal color.  Absent: rash





Course


 Vital Signs











  12/30/18





  07:07


 


Temperature 98 F


 


Pulse Rate 83


 


Respiratory 20





Rate 


 


Blood Pressure 153/87


 


O2 Sat by Pulse 100





Oximetry 














Medical Decision Making





- Medical Decision Making





Patient is a 33-year-old male presents referred to 7 hours of left flank pain 

radiating towards his groin.  He said history of kidney stones.  At this time 

Patient reports multiple episodes of vomiting due to pain.  He appeared in 

significant discomfort on initial exam.  He is given IV fluids, Toradol and 

morphine and Zofran.  He does have some improvement of his symptoms at this 

time.  Patient underwent CT abdomen and pelvis without contrast.  There is a 4 

mm distal left ureteral stone causing some mild to moderate hydronephrosis.  

Patient is given Flomax.  Patient's white blood cell count is mildly elevated 

this time.  Most likely related to vomiting.  Kidney function is preserved.  

Patient has been reported this time we will discharge him home with 

prescriptions for Toradol Zofran Norco and Flomax.  Discussed that he should 

follow-up with urology.  All questions answered and return parameters were 

discussed.





- Lab Data


Result diagrams: 


 12/30/18 07:20





 12/30/18 07:20


 Lab Results











  12/30/18 12/30/18 12/30/18 Range/Units





  07:20 07:20 07:20 


 


WBC   18.7 H   (3.8-10.6)  k/uL


 


RBC   4.33   (4.30-5.90)  m/uL


 


Hgb   14.3   (13.0-17.5)  gm/dL


 


Hct   42.3   (39.0-53.0)  %


 


MCV   97.7   (80.0-100.0)  fL


 


MCH   32.9   (25.0-35.0)  pg


 


MCHC   33.7   (31.0-37.0)  g/dL


 


RDW   12.9   (11.5-15.5)  %


 


Plt Count   289   (150-450)  k/uL


 


Neutrophils %   90   %


 


Lymphocytes %   5   %


 


Monocytes %   5   %


 


Eosinophils %   0   %


 


Basophils %   0   %


 


Neutrophils #   16.8 H   (1.3-7.7)  k/uL


 


Lymphocytes #   0.9 L   (1.0-4.8)  k/uL


 


Monocytes #   0.8   (0-1.0)  k/uL


 


Eosinophils #   0.0   (0-0.7)  k/uL


 


Basophils #   0.0   (0-0.2)  k/uL


 


PT    10.3  (9.0-12.0)  sec


 


INR    1.0  (<1.2)  


 


APTT    26.5  (22.0-30.0)  sec


 


Sodium  142    (137-145)  mmol/L


 


Potassium  4.2    (3.5-5.1)  mmol/L


 


Chloride  106    ()  mmol/L


 


Carbon Dioxide  28    (22-30)  mmol/L


 


Anion Gap  8    mmol/L


 


BUN  18    (9-20)  mg/dL


 


Creatinine  1.27 H    (0.66-1.25)  mg/dL


 


Est GFR (CKD-EPI)AfAm  84    (>60 ml/min/1.73 sqM)  


 


Est GFR (CKD-EPI)NonAf  73    (>60 ml/min/1.73 sqM)  


 


Glucose  103 H    (74-99)  mg/dL


 


Calcium  9.7    (8.4-10.2)  mg/dL


 


Total Bilirubin  0.6    (0.2-1.3)  mg/dL


 


AST  23    (17-59)  U/L


 


ALT  21    (21-72)  U/L


 


Alkaline Phosphatase  68    ()  U/L


 


Total Protein  7.5    (6.3-8.2)  g/dL


 


Albumin  4.5    (3.5-5.0)  g/dL


 


Amylase  81    ()  U/L


 


Lipase  154    ()  U/L














- Radiology Data


Radiology results: report reviewed


CT shows a 4 mm distal left ureteral calculus with mild to moderate left-sided 

hydroureter nephrosis.  Additional nonobstructing colliculi are seen 

bilaterally.  Mild left renal edema with perinephric stranding.





Disposition


Clinical Impression: 


 Left ureteral calculus, Hydronephrosis





Disposition: HOME SELF-CARE


Condition: Good


Instructions:  Ureteral Stones (ED)


Additional Instructions: 


Patient has follow-up with primary care physician.  Return to emergency 

department if any alarming signs or symptoms occur.  Follow-up with urology as 

well.


Prescriptions: 


HYDROcodone/APAP 5-325MG [Norco 5-325] 1 tab PO Q6HR PRN 3 Days #12 tab


 PRN Reason: Pain


Ketorolac [Toradol] 10 mg PO Q6HR #15 tab


Ondansetron Odt [Zofran Odt] 4 mg PO Q8HR PRN #15 tab


 PRN Reason: Nausea


Tamsulosin [Flomax] 0.4 mg PO DAILY #10 cap


Is patient prescribed a controlled substance at d/c from ED?: Yes


When asked, does pt state using other controlled substances?: No


If prescribed controlled substance>3 days was MAPS reviewed?: Prescribed <3 Days


If opioid is for acute pain is fill amount 7 days or less?: Yes


If Rx opioid, was Start Talking consent form obtained?: Yes


Referrals: 


Louis Marinelli MD [Primary Care Provider] - 1-2 days


Robbi Angelo MD [STAFF PHYSICIAN] - 1-2 days


Time of Disposition: 09:20

## 2018-12-30 NOTE — CT
EXAMINATION TYPE: CT abdomen pelvis wo con

 

DATE OF EXAM: 12/30/2018

 

COMPARISON: 9/17/2018

 

HISTORY: Lt flank pain

 

CT DLP: 303.9 mGycm

 

Examination of the solid and hollow viscera is limited given the lack of contrast.

 

FINDINGS: 

 

LUNG BASES: No evidence for nodule. No evidence for infiltrate.

 

LIVER/GB: The gallbladder is unremarkable. No space-occupying hepatic lesion.

 

PANCREAS: No pancreatic mass identified. No inflammatory process seen.

 

SPLEEN: No evidence for splenomegaly. No intrasplenic lesions seen. Splenic granulomas identified.

 

ADRENALS: No adrenal nodules identified. No evidence for thickening.

 

KIDNEYS: 4 mm distal left ureteral calculus with mild to moderate left-sided hydroureteronephrosis. A
dditional nonobstructing calculi seen bilaterally. Mild left renal edema with perinephric stranding.

 

BOWEL: Appendix has a normal appearance. No evidence of bowel obstruction. No inflammatory process.

 

Lymph nodes: No evidence for adenopathy greater than 1 cm.

 

Abdominal aorta: Atheromatous changes seen. No evidence for aneurysm.

 

Genital organs: No significant abnormality.

 

Other: No significant abnormality.

 

IMPRESSION: 

4 mm distal left ureteral calculus with mild to moderate left-sided hydroureteronephrosis. Additional
 nonobstructing calculi seen bilaterally. Mild left renal edema with perinephric stranding.